# Patient Record
(demographics unavailable — no encounter records)

---

## 2024-12-14 NOTE — DISCUSSION/SUMMARY
[Medication Risks Reviewed] : Medication risks reviewed [de-identified] : reviewed the case and the imaging with the patient  prior lumbar decompression  new lpb discussion of the condition and treatment options cautions discussed questions answered discussion of natural history of the condition and what the next step would be nsaid/tpi if not getting better rec MRi - he can call and set that up

## 2024-12-14 NOTE — HISTORY OF PRESENT ILLNESS
[8] : 8 [4] : 4 [Dull/Aching] : dull/aching [Sharp] : sharp [Tightness] : tightness [Rest] : rest [Meds] : meds [Full time] : Work status: full time [] : no [de-identified] : standing up  [de-identified] : 12/14/24 this is a 44-year-old male presenting with lower back pain. bent forward   3x prior lumbar decomrpession   xrays today: L spine - mild spondylosis  AP PELVIS - negative   healthy otherwise  physical therapist  athlete

## 2024-12-14 NOTE — HISTORY OF PRESENT ILLNESS
[8] : 8 [4] : 4 [Dull/Aching] : dull/aching [Sharp] : sharp [Tightness] : tightness [Rest] : rest [Meds] : meds [Full time] : Work status: full time [] : no [de-identified] : standing up  [de-identified] : 12/14/24 this is a 44-year-old male presenting with lower back pain. bent forward   3x prior lumbar decomrpession   xrays today: L spine - mild spondylosis  AP PELVIS - negative   healthy otherwise  physical therapist  athlete

## 2024-12-14 NOTE — DISCUSSION/SUMMARY
[Medication Risks Reviewed] : Medication risks reviewed [de-identified] : reviewed the case and the imaging with the patient  prior lumbar decompression  new lpb discussion of the condition and treatment options cautions discussed questions answered discussion of natural history of the condition and what the next step would be nsaid/tpi if not getting better rec MRi - he can call and set that up

## 2024-12-14 NOTE — PROCEDURE
[Right] : of the right [Trigger point 1-2 muscle groups] : trigger point 1-2 muscle groups [Left] : of the left [Lumbar paraspinal muscle] : lumbar paraspinal muscle [Pain] : pain [Alcohol] : alcohol [Betadine] : betadine [Ethyl Chloride sprayed topically] : ethyl chloride sprayed topically [___ cc    1%] : Lidocaine ~Vcc of 1%  [___ cc    0.25%] : Bupivacaine (Marcaine) ~Vcc of 0.25%  [___ cc    10mg] : Triamcinolone (Kenalog) ~Vcc of 10 mg  [Previous OTC use and PT nontherapeutic] : patient has tried OTC's including aspirin, Ibuprofen, Aleve, etc or prescription NSAIDS, and/or exercises at home and/or physical therapy without satisfactory response [FreeTextEntry3] : the pain was 8 before and 4 after the injection